# Patient Record
Sex: MALE | Race: NATIVE HAWAIIAN OR OTHER PACIFIC ISLANDER
[De-identification: names, ages, dates, MRNs, and addresses within clinical notes are randomized per-mention and may not be internally consistent; named-entity substitution may affect disease eponyms.]

---

## 2017-05-24 ENCOUNTER — HOSPITAL ENCOUNTER (EMERGENCY)
Dept: HOSPITAL 17 - NEPK | Age: 27
Discharge: HOME | End: 2017-05-24
Payer: COMMERCIAL

## 2017-05-24 VITALS
SYSTOLIC BLOOD PRESSURE: 145 MMHG | OXYGEN SATURATION: 99 % | HEART RATE: 78 BPM | RESPIRATION RATE: 13 BRPM | DIASTOLIC BLOOD PRESSURE: 85 MMHG | TEMPERATURE: 98.9 F

## 2017-05-24 VITALS — WEIGHT: 220.46 LBS | HEIGHT: 71 IN | BODY MASS INDEX: 30.86 KG/M2

## 2017-05-24 DIAGNOSIS — Z72.0: ICD-10-CM

## 2017-05-24 DIAGNOSIS — H60.311: Primary | ICD-10-CM

## 2017-05-24 PROCEDURE — 99283 EMERGENCY DEPT VISIT LOW MDM: CPT

## 2017-05-24 NOTE — PD
HPI


.


right ear pain x 2 days


Chief Complaint:  ENT Complaint


Time Seen by Provider:  21:02


Travel History


International Travel<30 days:  No


Contact w/Intl Traveler<30days:  No


Traveled to known affect area:  No





History of Present Illness


HPI


26-year-old male with no past medical history here with complaints of right ear 

pain for the past 2 days.  Patient is complaining of right ear pain, but denies 

any drainage, fever or chills.  He tells me that he is swimming daily.  He is 

accompanied by his best friend.





PFS


Past Medical History


Immunizations Current:  Yes





Social History


Alcohol Use:  Yes (daily)


Tobacco Use:  Yes


Substance Use:  No





Allergies-Medications


(Allergen,Severity, Reaction):  


Coded Allergies:  


     No Known Allergies (Unverified , 5/24/17)


Reported Meds & Prescriptions





Reported Meds & Active Scripts


Active


Ciprodex Otic Drops (Ciprofloxacin-Dexamethasone Otic Drops) 0.3-0.1% Susp 4 

Drop RIGHT EAR BID








Review of Systems


General / Constitutional:  No: Fever


Eyes:  No: Visual changes


HENT:  Positive: Earache (right ),  No: Headaches


Cardiovascular:  No: Chest Pain or Discomfort


Respiratory:  No: Shortness of Breath


Gastrointestinal:  No: Abdominal Pain


Genitourinary:  No: Dysuria


Musculoskeletal:  No: Pain


Skin:  No Rash


Neurologic:  No: Weakness


Psychiatric:  No: Depression


Endocrine:  No: Polydipsia


Hematologic/Lymphatic:  No: Easy Bruising





Physical Exam


Narrative


GENERAL: AAO x 3, no acute distress, Well-nourished, well-developed patient.


SKIN: Warm and dry. No visible rashes or bruising. 


HEAD: Normocephalic and atraumatic.


EYES: No scleral icterus. No injection or drainage. EOM intact, PERRLA


ENT: No nasal drainage noted. Mucous membranes pink. Airway patent.  Right ear 

canal erythematous with edema and what appears to be some purulent discharge.  

Tenderness to the tragus.  TMs normal bilaterally.  No posterior pharynx 

erythema, edema or exudates.  No mastoid process tenderness.


NECK: Supple, trachea midline. No JVD.  No lymphadenopathy.


CARDIOVASCULAR: Regular rate and rhythm without murmurs, gallops, or rubs. 


RESPIRATORY: Breath sounds equal bilaterally. No accessory muscle use. No 

rhonchi or rales. 


GASTROINTESTINAL: Visual inspection normal


EXTREMITIES: No cyanosis or edema. 


BACK: Nontender without obvious deformity. No CVA tenderness.


PSYCH: AAO x 3, normal affect.





Data


Data


Last Documented VS





Vital Signs








  Date Time  Temp Pulse Resp B/P Pulse Ox O2 Delivery O2 Flow Rate FiO2


 


5/24/17 20:49 98.9 78 13 145/85 99 Room Air  











MDM


Medical Decision Making


Medical Screen Exam Complete:  Yes


Emergency Medical Condition:  Yes


Medical Record Reviewed:  Yes


Differential Diagnosis


Right otitis externa, otitis media, mastoiditis


Narrative Course


26-year-old male here with right ear pain for 2 days.


On examination he appears to have a right otitis externa with erythema, edema 

and some purulent drainage in the right ear canal.  The TMs are normal 

bilaterally.


I will go ahead and prescribe him some Ciprodex.


I've advised no swimming for the next week.








Patient verbalized understanding of instructions, questions were answered, and 

thanked me for their care. I advised them if their condition worsens, please 

return to the nearest emergency room for further care.





Diagnosis





 Primary Impression:  


 Otitis externa of right ear


 Qualified Code:  H60.311 - Acute diffuse otitis externa of right ear


Patient Instructions:  General Instructions





***Additional Instructions:


Please return to emergency department if your symptoms return or worsen. 


Follow up with your primary care provider. 


Take medications as prescribed.





No swimming for the next week.


Do not use Q-tips or insert any objects into your ear.


***Med/Other Pt SpecificInfo:  Prescription(s) given


Scripts


Ciprofloxacin-Dexamethasone Otic Drops (Ciprodex Otic Drops)0.3-0.1% Susp4 Drop 

RIGHT EAR BID  #1 BOTTLE


   Prov:Juanito Najera MD         5/24/17


Disposition:  01 DISCHARGE HOME


Condition:  Stable








Rosa Johnson May 24, 2017 21:02

## 2018-02-26 ENCOUNTER — HOSPITAL ENCOUNTER (EMERGENCY)
Dept: HOSPITAL 17 - NEPK | Age: 28
Discharge: HOME | End: 2018-02-26
Payer: COMMERCIAL

## 2018-02-26 VITALS
HEART RATE: 78 BPM | RESPIRATION RATE: 18 BRPM | TEMPERATURE: 98.6 F | DIASTOLIC BLOOD PRESSURE: 64 MMHG | SYSTOLIC BLOOD PRESSURE: 136 MMHG | OXYGEN SATURATION: 100 %

## 2018-02-26 DIAGNOSIS — L03.213: ICD-10-CM

## 2018-02-26 DIAGNOSIS — H00.011: Primary | ICD-10-CM

## 2018-02-26 DIAGNOSIS — Z72.0: ICD-10-CM

## 2018-02-26 PROCEDURE — 99283 EMERGENCY DEPT VISIT LOW MDM: CPT

## 2018-02-26 NOTE — PD
HPI


Chief Complaint:  Eye Problems/Injury


Time Seen by Provider:  19:15


Travel History


International Travel<30 days:  No


Contact w/Intl Traveler<30days:  No


Traveled to known affect area:  No





History of Present Illness


HPI


27-year-old male says for evaluation of a painful skin lesions to the right 

upper eyelid margin.  Aching pain, constant, worse with palpation.  Denies any 

drainage.  Denies any fevers, chills.  Symptoms started 1 week ago.  He has no 

other complaints at this time.





[facility]


Past Medical History


Immunizations Current:  Yes





Social History


Alcohol Use:  Yes (daily)


Tobacco Use:  Yes


Substance Use:  No





Allergies-Medications


(Allergen,Severity, Reaction):  


Coded Allergies:  


     No Known Allergies (Unverified , 5/24/17)


Reported Meds & Prescriptions





Reported Meds & Active Scripts


Active


Clindamycin (Clindamycin HCl) 300 Mg Cap 300 Mg PO TID 7 Days


Ciprodex Otic Drops (Ciprofloxacin-Dexamethasone Otic Drops) 0.3-0.1% Susp 4 

Drop RIGHT EAR BID








Review of Systems


General / Constitutional:  No: Fever, Chills


Eyes:  No: Diploplia, Drainage


Skin:  Positive Other (painful skin lesion to the right upper eyelid)





Physical Exam


Narrative


GENERAL:  Well-nourished male in no acute distress


SKIN: Warm and dry.  There is a tender fluctuant stye noted to the right upper 

eyelid.  There is some surrounding erythema.


HEAD: Atraumatic. Normocephalic. 


EYES: Pupils equal and round. No scleral icterus. No injection or drainage. 


ENT: No nasal bleeding or discharge.  Mucous membranes pink and moist.


NECK: Trachea midline. No JVD. 


CARDIOVASCULAR: Regular rate and rhythm.  No murmur appreciated.


RESPIRATORY: No accessory muscle use. Clear to auscultation. Breath sounds 

equal bilaterally.





Data


Data


Last Documented VS





Vital Signs








  Date Time  Temp Pulse Resp B/P (MAP) Pulse Ox O2 Delivery O2 Flow Rate FiO2


 


2/26/18 18:00 98.6 78 18 136/64 (88) 100   








Orders





 Orders


Ed Discharge Order (2/26/18 19:15)


Clindamycin (Cleocin) (2/26/18 19:15)








MDM


Medical Decision Making


Medical Screen Exam Complete:  Yes


Emergency Medical Condition:  Yes


Medical Record Reviewed:  Yes


Differential Diagnosis


Hordeolum, chalazion, periorbital cellulitis


Narrative Course


The patient has a hordeolum with some mild surrounding cellulitic changes to 

the right upper eyelid.  He will be discharged with clindamycin.





Diagnosis





 Primary Impression:  


 Hordeolum


 Additional Impression:  


 Periorbital cellulitis


Departure Forms:  School Release,    Return to School Date:  Feb 28, 2018


   


   Tests/Procedures





***Additional Instructions:  


Warm compresses several times a day 15 minutes at a time.  Antibiotic as 

prescribed.  Return for any acutely new or worsening symptoms.


***Med/Other Pt SpecificInfo:  Prescription(s) given


Scripts


Clindamycin (Clindamycin) 300 Mg Cap


300 MG PO TID for Infection for 7 Days, CAP 0 Refills


   Prov: Hari Youngblood MD         2/26/18


Disposition:  01 DISCHARGE HOME


Condition:  Stable











Joseph Thomas Feb 26, 2018 19:20